# Patient Record
Sex: FEMALE | Race: WHITE | URBAN - METROPOLITAN AREA
[De-identification: names, ages, dates, MRNs, and addresses within clinical notes are randomized per-mention and may not be internally consistent; named-entity substitution may affect disease eponyms.]

---

## 2019-11-07 ENCOUNTER — HOSPITAL ENCOUNTER (OUTPATIENT)
Dept: PHYSICAL THERAPY | Age: 54
Setting detail: THERAPIES SERIES
Discharge: HOME | End: 2019-11-07
Payer: COMMERCIAL

## 2019-11-07 PROCEDURE — 97162 PT EVAL MOD COMPLEX 30 MIN: CPT

## 2019-11-07 PROCEDURE — 97140 MANUAL THERAPY 1/> REGIONS: CPT

## 2019-11-07 ASSESSMENT — PAIN SCALES - GENERAL: PAINLEVEL_OUTOF10: 1

## 2019-11-11 ENCOUNTER — HOSPITAL ENCOUNTER (OUTPATIENT)
Dept: PHYSICAL THERAPY | Age: 54
Setting detail: THERAPIES SERIES
Discharge: HOME | End: 2019-11-11
Payer: COMMERCIAL

## 2019-11-11 PROCEDURE — 97140 MANUAL THERAPY 1/> REGIONS: CPT

## 2019-11-11 PROCEDURE — 97110 THERAPEUTIC EXERCISES: CPT

## 2019-11-15 ENCOUNTER — HOSPITAL ENCOUNTER (OUTPATIENT)
Dept: PHYSICAL THERAPY | Age: 54
Setting detail: THERAPIES SERIES
Discharge: HOME | End: 2019-11-15
Payer: COMMERCIAL

## 2019-11-15 PROCEDURE — 97110 THERAPEUTIC EXERCISES: CPT

## 2019-11-15 PROCEDURE — 97140 MANUAL THERAPY 1/> REGIONS: CPT

## 2019-11-19 ENCOUNTER — HOSPITAL ENCOUNTER (OUTPATIENT)
Dept: PHYSICAL THERAPY | Age: 54
Setting detail: THERAPIES SERIES
Discharge: HOME | End: 2019-11-19
Payer: COMMERCIAL

## 2019-11-19 PROCEDURE — 97110 THERAPEUTIC EXERCISES: CPT

## 2019-11-22 ENCOUNTER — HOSPITAL ENCOUNTER (OUTPATIENT)
Dept: PHYSICAL THERAPY | Age: 54
Setting detail: THERAPIES SERIES
Discharge: HOME | End: 2019-11-22
Payer: COMMERCIAL

## 2019-11-22 PROCEDURE — 97110 THERAPEUTIC EXERCISES: CPT

## 2019-11-22 PROCEDURE — 97140 MANUAL THERAPY 1/> REGIONS: CPT

## 2019-11-27 ENCOUNTER — APPOINTMENT (OUTPATIENT)
Dept: PHYSICAL THERAPY | Age: 54
End: 2019-11-27
Payer: COMMERCIAL

## 2019-11-29 ENCOUNTER — HOSPITAL ENCOUNTER (OUTPATIENT)
Dept: PHYSICAL THERAPY | Age: 54
Setting detail: THERAPIES SERIES
Discharge: HOME | End: 2019-11-29
Payer: COMMERCIAL

## 2019-11-29 PROCEDURE — 97110 THERAPEUTIC EXERCISES: CPT

## 2019-11-29 PROCEDURE — 97140 MANUAL THERAPY 1/> REGIONS: CPT

## 2019-12-02 ENCOUNTER — HOSPITAL ENCOUNTER (OUTPATIENT)
Dept: PHYSICAL THERAPY | Age: 54
Setting detail: THERAPIES SERIES
Discharge: HOME | End: 2019-12-02
Payer: COMMERCIAL

## 2019-12-02 PROCEDURE — 97140 MANUAL THERAPY 1/> REGIONS: CPT

## 2019-12-02 PROCEDURE — 97110 THERAPEUTIC EXERCISES: CPT

## 2019-12-06 ENCOUNTER — HOSPITAL ENCOUNTER (OUTPATIENT)
Dept: PHYSICAL THERAPY | Age: 54
Setting detail: THERAPIES SERIES
Discharge: HOME | End: 2019-12-06
Payer: COMMERCIAL

## 2019-12-06 PROCEDURE — 97110 THERAPEUTIC EXERCISES: CPT

## 2019-12-06 PROCEDURE — 97140 MANUAL THERAPY 1/> REGIONS: CPT

## 2019-12-09 ENCOUNTER — HOSPITAL ENCOUNTER (OUTPATIENT)
Dept: PHYSICAL THERAPY | Age: 54
Setting detail: THERAPIES SERIES
Discharge: HOME | End: 2019-12-09
Payer: COMMERCIAL

## 2019-12-09 PROCEDURE — 97140 MANUAL THERAPY 1/> REGIONS: CPT

## 2019-12-09 PROCEDURE — 97110 THERAPEUTIC EXERCISES: CPT

## 2019-12-13 ENCOUNTER — HOSPITAL ENCOUNTER (OUTPATIENT)
Dept: PHYSICAL THERAPY | Age: 54
Setting detail: THERAPIES SERIES
Discharge: HOME | End: 2019-12-13
Payer: COMMERCIAL

## 2019-12-13 PROCEDURE — 97110 THERAPEUTIC EXERCISES: CPT

## 2019-12-16 ENCOUNTER — HOSPITAL ENCOUNTER (OUTPATIENT)
Dept: PHYSICAL THERAPY | Age: 54
Setting detail: THERAPIES SERIES
Discharge: HOME | End: 2019-12-16
Payer: COMMERCIAL

## 2019-12-16 PROCEDURE — 97110 THERAPEUTIC EXERCISES: CPT

## 2019-12-16 PROCEDURE — 97140 MANUAL THERAPY 1/> REGIONS: CPT

## 2019-12-20 ENCOUNTER — HOSPITAL ENCOUNTER (OUTPATIENT)
Dept: PHYSICAL THERAPY | Age: 54
Setting detail: THERAPIES SERIES
End: 2019-12-20
Payer: COMMERCIAL

## 2019-12-31 ENCOUNTER — HOSPITAL ENCOUNTER (OUTPATIENT)
Dept: PHYSICAL THERAPY | Age: 54
Setting detail: THERAPIES SERIES
Discharge: HOME | End: 2019-12-31
Payer: COMMERCIAL

## 2019-12-31 PROCEDURE — 97110 THERAPEUTIC EXERCISES: CPT

## 2019-12-31 PROCEDURE — 97140 MANUAL THERAPY 1/> REGIONS: CPT

## 2019-12-31 NOTE — FLOWSHEET NOTE
Physical Therapy Daily Treatment Note    Date:  2019    Patient Name:  Donna Fallon    :  1965  MRN: [id number]  Restrictions/Precautions:    Medical/Treatment Diagnosis Information:  · Diagnosis: L Hip Trochanteric Bursitis  Insurance/Certification information:  PT Insurance Information: BCBS  Physician Information:  Referring Practitioner: Shavonne Faust  Plan of care signed (Y/N):  Y  Visit# / total visits:  10/10 1/PRN    G-Code (if applicable):         LEFS  19  20% disability    At initial evaluation 37% disability     Time in:  1:30    Timed Treatment: 30 Total Treatment Time:  30  Time out: 2:00  ________________________________________________________________________________________    Pain Level:    0/10  SUBJECTIVE:  Pt states she is doing much better and significant improvement. Stiffness returned in the L hip and groin over the past couple of days. 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 Worst pain with sitting and increases after a half hour. Standing and walking will loosen it up and decrease the pain. Standing will increase pain 30 mins then has to rest and sit. Walking without limits to pain but pain increases after 60 mins. No problems with sleeping now. pain worse in the am when she wakes then loosens up. Sit to stand is painful in the L lateral hip. No pain in the R hip or LE.   LBP is better    OBJECTIVE:     Exercise/Equipment Resistance/Repetitions Other comments          TA sets with BP cuff         With march       With KFO    HEP video    3 way hip flexor stretching  x5 mins  HEP video    Bridging 10x5 secs  HEP video    Clamshells 1 and 2   HEP video    Prone hip extension       Lumbo-pelvic stabilization     Prone K' flexion with adduction     L hip IR/ER neuro re-ed   x4 mins     LTR        Stacking with MB     2x30 secs B  Decreased L gluteal contraction sensation B LE, normal DTR 2+ B LEs. Other: (+) non-relevant slump B L worse than R.         ASSESSMENT:    Overall, significant gains with therapy. Hip stiffness resolved with manual therapy today. Continue to progress POC. Treatment/Activity Tolerance:   [x]Patient tolerated treatment well [] Patient limited by fatique  []Patient limited by pain [] Patient limited by other medical complications  [] Other:     Goals:    Long term goals  Time Frame for Long term goals : 4 weeks   Long term goal 1: Pt independent with HEP (met)  Long term goal 2: Pt gluteal strength imrpove by 1/3 muscle grade  Long term goal 3: Pt sit without limits to pain  (improved)  Long term goal 4: Pt stand for functional tasks wtihout limits to pain (met)  Long term goal 5: Pt return to exercising without limits to pain.   (improved)     Plan: [x] Continue per plan of care [] Alter current plan (see comments)   [] Plan of care initiated [] Hold pending MD visit [] Discharge      Plan for Next Session:      Re-Certification Due Date:         Signature:  Bj Sorto , PT

## 2020-01-15 ENCOUNTER — HOSPITAL ENCOUNTER (OUTPATIENT)
Dept: PHYSICAL THERAPY | Age: 55
Setting detail: THERAPIES SERIES
Discharge: HOME | End: 2020-01-15
Payer: COMMERCIAL

## 2020-01-15 PROCEDURE — 97110 THERAPEUTIC EXERCISES: CPT

## 2020-01-15 PROCEDURE — 97140 MANUAL THERAPY 1/> REGIONS: CPT

## 2020-01-15 NOTE — FLOWSHEET NOTE
re-ed   x4 mins     LTR        Stacking with MB     2x30 secs B  Decreased L gluteal contraction   Gluteal sets standing     Rockerboard    x1 min rocking each  x1 min F/B M/L     Standing multifidus   x15 B maroon TB     Standing S' extension  x15 B maroon TB     TG  x6 mins     Kneeling S' horiz abd with TA set       Hip abd        3 way hip with SC  15# 2x15      Posterior sling   4\"     Standing TA set with S' horiz abd x15 grey TB     Lateral sling        Other Therapeutic Activities:      Manual Treatments:  Gr 4 LAD L LE, , L hip MWM hip ER, hip flexion. .  Lumbar gapping L L3-4, L4-5 followed by lumbar needing and SB mobs gr 3-4. . .  Gr 4 thoracic distraction with cavitation at set up. Prone L SI gr 4 distraction with cavitation. Modalities:      Test/Measurements:    Today:   No LLD  Full AROM lumbar spine  Hypomobility L hip with pain in hip IR/ER end range   Tightness L iliopsoas    At initial evaluation  Posture: Fair(sway back without gluteal and core activation)  Observation: Standing:  Increased B knee valgus and toeing out. Elevate L pelvis. Side view with increased lumbar compression at L4-5 (point of pain), sway back wtih forward hips. Decresaed gluteal activation. Body Mechanics: SLS R with lateral trunk compensation, trendelenberg, LOB, hip IR. SLS L significant LOB, lateral trunk compensation and trendelenberg. SLS squat with quad dominance, hip IR and trendelenberg B, L worse than R. Squat test Q dominance with slight R weight shifting. AMB: Increased B hip IR, K' valgus, ERS lumbar spine, femoral amb wtih decreased hip ext B and push off B. Lumbar: Hinge at T12-L1 with B SB. Otherwise, full ROM all directions without pain. Joint Mobility  Spine: Hypomobility L1-3.   Hypomobility L SI.       Strength RLE  Strength RLE: WFL  Comment: Hip ER/IR 4-/5; PGM 3-/5; hip ext 4-/5  Strength LLE  Strength LLE: WFL  Comment: 4+/5 L LE, Hip ER/IR 4-/5; PGM 3-/5; hip ext 4-/5 Additional Measures  Flexibility: R hip ER 20 degrees, L hip IR 10 degrees. B hip flexion 90 degrees. Special Tests: L PI with (+) L flick test.  (-) clonus, (-) babinski, normal sensation B LE, normal DTR 2+ B LEs. Other: (+) non-relevant slump B L worse than R.         ASSESSMENT:   Hip stiffness resolved with manual therapy today. Continue to progress POC. Treatment/Activity Tolerance:   [x]Patient tolerated treatment well [] Patient limited by fatique  []Patient limited by pain [] Patient limited by other medical complications  [] Other:     Goals:    Long term goals  Time Frame for Long term goals : 4 weeks   Long term goal 1: Pt independent with HEP (met)  Long term goal 2: Pt gluteal strength imrpove by 1/3 muscle grade  Long term goal 3: Pt sit without limits to pain  (improved)  Long term goal 4: Pt stand for functional tasks wtihout limits to pain (met)  Long term goal 5: Pt return to exercising without limits to pain.   (improved)     Plan: [x] Continue per plan of care [] Alter current plan (see comments)   [] Plan of care initiated [] Hold pending MD visit [] Discharge      Plan for Next Session:      Re-Certification Due Date:         Signature:  Kristofer Patel , PT

## 2020-02-03 ENCOUNTER — HOSPITAL ENCOUNTER (OUTPATIENT)
Dept: PHYSICAL THERAPY | Age: 55
Setting detail: THERAPIES SERIES
Discharge: HOME | End: 2020-02-03
Payer: COMMERCIAL

## 2020-02-03 PROCEDURE — 97110 THERAPEUTIC EXERCISES: CPT

## 2020-02-03 PROCEDURE — 97140 MANUAL THERAPY 1/> REGIONS: CPT

## 2020-02-03 NOTE — PROGRESS NOTES
Outpatient Physical Therapy  Phone: [phone] Fax: [phone]     To: Guanakito Louis      From: Abisai Leslie PT     Patient: Patricia Umana       : 1965  Diagnosis: L hip Trochanteric Bursitis  Date: 2/3/2020  Treatment Diagnosis:      Physical Therapy Progress/Discharge Note    Total Visits to date:   15  Cancels/No-shows to date:      Plan of Care/Treatment to date:  [x] Therapeutic Exercise    [] Modalities:  [x] Therapeutic Activity     [] Ultrasound   [] Electrical Stimulation   [x] Gait Training      [] Cervical Traction   [] Lumbar Traction  [x] Neuromuscular Re-education  [] Cold/hotpack  [] Iontophoresis  [x] Instruction in HEP      Other:  [x] Manual Therapy       []                                 [] Aquatic Therapy       []                                     Progress towards goals:  See progress note       Frequency/Duration:  # Days per week: [x] 1 day # Weeks: [] 1 week [x] 4 weeks      [x] 2 days   [] 2 weeks [] 5 weeks     [] 3 days   [] 3 weeks [] 6 weeks     Rehab Potential: [] Excellent [x] Good [] Fair  [] Poor     Goal Status:  [] Achieved [x] Partially Achieved  [] Not Achieved     Patient Status: [] Continue per initial plan of Care     [] Patient now discharged     [x] Additional visits requested, Please re-certify for additional visits:      Requested frequency/duration:  2X/week for 4 weeks    Electronically signed by:  Abisai Leslie PT    If you have any questions or concerns, please don't hesitate to call.   Thank you for your referral.    Physician Signature:________________________________Date:__________________  By signing above, therapists plan is approved by physician
by other medical complications  [] Other:     Goals:    Long term goals   Time Frame for Long term goals : 4 weeks   Long term goal 1: Pt independent with HEP (met)  Long term goal 2: Pt gluteal strength imrpove by 1/3 muscle grade (not met; decreased from previous assessment)  Long term goal 3: Pt sit without limits to pain  (improved)  Long term goal 4: Pt stand for functional tasks wtihout limits to pain (met)  Long term goal 5: Pt return to exercising without limits to pain. (improved)     Plan: [x] Continue per plan of care [] Alter current plan (see comments)   [] Plan of care initiated [] Hold pending MD visit [] Discharge      Plan for Next Session:       Re-Certification Due Date:         Signature:  Carola Viera PT  Brett Keller, SPT  Physical Therapist was present, directed the patient's care, made skilled judgement, and was responsible for assessment and treatment of the patient.

## 2020-02-03 NOTE — FLOWSHEET NOTE
Therapeutic Activities:      Manual Treatments:  Gr 4 LAD L LE, , L hip MWM hip ER, hip flexion. STM L QL followed by manual stretching and kneading. Lumbar gapping L L3-4, L4-5 followed by lumbar needing and SB mobs gr 3-4. . .  Gr 4 thoracic distraction with cavitation at set up. Prone L SI gr 4 distraction with cavitation. L sacral SB correction through 3 breaths followed by S3 through 3 breaths. Modalities:      Test/Measurements:    Today:   LLD L longer than R  L AI   L sacral SB   Full AROM lumbar spine  Tightness L iliopsoas    Strength RLE  Strength RLE: WFL  Comment: Hip ER/IR 4-/5; PGM 3-/5; hip ext 4-/5  Strength LLE  Strength LLE: WFL  Comment: 4+/5 L LE, Hip ER/IR 4-/5; PGM 3-/5; hip ext 4-/5  Hip B AGMR, ERS lumbar spine    At initial evaluation  Posture: Fair(sway back without gluteal and core activation)  Observation: Standing:  Increased B knee valgus and toeing out. Elevate L pelvis. Side view with increased lumbar compression at L4-5 (point of pain), sway back wtih forward hips. Decresaed gluteal activation. Body Mechanics: SLS R with lateral trunk compensation, trendelenberg, LOB, hip IR. SLS L significant LOB, lateral trunk compensation and trendelenberg. SLS squat with quad dominance, hip IR and trendelenberg B, L worse than R. Squat test Q dominance with slight R weight shifting. AMB: Increased B hip IR, K' valgus, ERS lumbar spine, femoral amb wtih decreased hip ext B and push off B. Lumbar: Hinge at T12-L1 with B SB. Otherwise, full ROM all directions without pain. Joint Mobility  Spine: Hypomobility L1-3. Hypomobility L SI.       Strength RLE  Strength RLE: WFL  Comment: Hip ER/IR 4-/5; PGM 3-/5; hip ext 4-/5  Strength LLE  Strength LLE: WFL  Comment: 4+/5 L LE, Hip ER/IR 4-/5; PGM 3-/5; hip ext 4-/5   Additional Measures  Flexibility: R hip ER 20 degrees, L hip IR 10 degrees. B hip flexion 90 degrees.    Special Tests: L PI with (+) L flick test.  (-) clonus,

## 2020-02-25 ENCOUNTER — HOSPITAL ENCOUNTER (OUTPATIENT)
Dept: PHYSICAL THERAPY | Age: 55
Setting detail: THERAPIES SERIES
Discharge: HOME | End: 2020-02-25
Payer: COMMERCIAL

## 2020-02-25 PROCEDURE — 97162 PT EVAL MOD COMPLEX 30 MIN: CPT

## 2020-02-25 NOTE — PROGRESS NOTES
Physical Therapy  Initial Assessment  Date: 2020  Patient Name: Haritha Barajas  MRN: [id number]  : 1965      Subjective   General  Chart Reviewed: Yes  Patient assessed for rehabilitation services?: Yes  Family / Caregiver Present: No  Referring Practitioner: Rosamaria Dudley  Referral Date : 20  Diagnosis: Troch bursitis, LBP  Follows Commands: Within Functional Limits  General Comment  Comments: PLOF: Pt likes to return to yoga, aerobics, and walking/hiking about 2 miles, all without limitations to pain. PT Visit Information  PT Insurance Information: BCBS  Subjective  Subjective: Pt reports pain in the L buttocks that started about three years ago and only one year ago the pain started radiating down into the leg. Pain at its worse is a sharp shooting 7-8/10, currently 2-3/10 and at best 1/10. To ease the pain she uses a topical cream, rests, or takes a hot bath. Ices the hip for pain. She's only able to sit for approximately 20 minutes before having limitations to pain along with driving. She states anything longer than hour standing or walking, she experiences pain in the back. Having difficutly getting to sleep because of the pain and occasionally wakes to pain when on her R side. She experiences increased pain in her hip when going up stairs and occasionally will experience pain in the back. Denies strength changes/weakness. Increased stiffness in the A.M. The pain radiates down the anterior leg into the lower lateral leg. She has difficulty crossing her legs B, but L > R.  Pain Screening  Patient Currently in Pain: Yes  Vital Signs  Patient Currently in Pain: Yes     Objective     Observation/Palpation  Observation: Standing: B knee valgus, Swayback posture with decreased gluteal activation, L pelvic rotation, decreased thoracic curvature,   Body Mechanics: SLS R with lateral trunk compensation, trendelenberg, LOB, hip IR. SLS L significant LOB, lateral trunk compensation and trendelenberg. SLS squat with quad dominance, hip IR and trendelenberg B, R worse than L. Squat test Q dominance with slight R weight shifting. AMB: Increased B hip IR, K' valgus, ERS lumbar spine, femoral amb wtih decreased hip ext B and push off B. PROM RLE (degrees)  RLE PROM: WNL  AROM RLE (degrees)  RLE AROM: WNL  PROM LLE (degrees)  LLE PROM: WNL  AROM LLE (degrees)  LLE AROM : WNL  Spine  Lumbar: Hinge at T12-L1 with B SB. Otherwise, full ROM all directions without pain. Joint Mobility  Spine: Hypomobility L1-3; Decreased rotation B R > L     Strength RLE  Strength RLE: WFL  Comment: Hip ER/IR 4-/5; PGM 3-/5; Knee flx 4/5  Strength LLE  Comment: Hip ER/IR 4-/5; PGM 3-/5; Knee flx 4/5     Additional Measures  Flexibility: R hip ER 20 degrees, L hip IR 10 degrees. Special Tests: L PI with (+) L flick test.  (-) clonus, (-) babinski, normal sensation B LE except increased sensitivity L L3,4. Normal DTR 2+ B LEs. Other: (+) relevant slump B  (+) SLR R leg       Assessment   Conditions Requiring Skilled Therapeutic Intervention  Assessment: Pt presents with movement impairments of ERS lumbar with amb. Exhibits neural involvement with (+) relevant slump test B. Significant weakness in B gluteals, PGM, exhibited by LOB, trendelenberg and hip IR with SLS testing.     Prognosis: Good  Decision Making: Medium Complexity  REQUIRES PT FOLLOW UP: Yes     Plan   Plan  Times per week: 2x/wk for 4 weeks   Current Treatment Recommendations: Strengthening, Gait Training, Manual Therapy - Joint Manipulation, ROM, Neuromuscular Re-education, Endurance Training, Manual Therapy - Soft Tissue Mobilization, Home Exercise Program, Patient/Caregiver Education & Training, Modalities, Pain Management    G-Code    Modified Oswestry  34% disability    Goals  Long term goals  Time Frame for Long term goals : 4 weeks   Long term goal 1: Pt independent with HEP   Long term goal 2: Pt gluteal strength imrpove by 1/3 muscle grade  Long term

## 2020-02-25 NOTE — FLOWSHEET NOTE
Physical Therapy Daily Treatment Note    Date:  2020    Patient Name:  Comfort Caceres    :  1965  MRN: [id number]  Restrictions/Precautions:    Medical/Treatment Diagnosis Information:  · Diagnosis: Troch bursitis, LBP  Insurance/Certification information:  PT Insurance Information: BCBS  Physician Information:  Referring Practitioner: Walter Ferris  Plan of care signed (Y/N):  Y  Visit# / total visits:       G-Code (if applicable): Modified Oswestry  34% disability    Time in:   11:00      Timed Treatment: 0  Total Treatment Time:  60  Time out: 12:00   ________________________________________________________________________________________    Pain Level:    /10  SUBJECTIVE:      Initial Eval: Pt reports pain in the L buttocks that started about three years ago and only one year ago the pain started radiating down into the leg. Pain at its worse is a sharp shooting 7-8/10, currently 2-3/10 and at best 1/10. To ease the pain she uses a topical cream, rests, or takes a hot bath. Ices the hip for pain. She's only able to sit for approximately 20 minutes before having limitations to pain along with driving. She states anything longer than hour standing or walking, she experiences pain in the back. Having difficutly getting to sleep because of the pain and occasionally wakes to pain when on her R side. She experiences increased pain in her hip when going up stairs and occasionally will experience pain in the back. Denies strength changes/weakness. Increased stiffness in the A.M. The pain radiates down the anterior leg into the lower lateral leg.  She has difficulty crossing her legs B, but L > R.    OBJECTIVE:     Exercise/Equipment Resistance/Repetitions Other comments            TA with BP cuff  Marching  KFO NV                                                                                                                                Other Therapeutic Activities:      Manual Treatments:

## 2020-02-28 ENCOUNTER — HOSPITAL ENCOUNTER (OUTPATIENT)
Dept: PHYSICAL THERAPY | Age: 55
Setting detail: THERAPIES SERIES
Discharge: HOME | End: 2020-02-28
Payer: COMMERCIAL

## 2020-02-28 PROCEDURE — 97110 THERAPEUTIC EXERCISES: CPT

## 2020-02-28 PROCEDURE — 97140 MANUAL THERAPY 1/> REGIONS: CPT

## 2020-02-28 NOTE — FLOWSHEET NOTE
secs  HEP video           Hip abd   15# 2x15 B     S' extension x15 maroon TB     L hip IR/ER neuro re-ed   x3 mins                                                  TG   x6 mins                                   Other Therapeutic Activities:      Manual Treatments: B LE pulling. B LAD gr 5. Lumbar gapping L without cavitation gr 3-4. L sacral SB correction through 3 breaths gr 4. S3 mob gr 4 through 3 breaths. STM L gluteal followed by MWM. Modalities:       Test/Measurements:         ASSESSMENT:   Pt responded well to manual therapy with decreased pain and improved mobility. Treatment/Activity Tolerance:   [x]Patient tolerated treatment well [] Patient limited by fatique  []Patient limited by pain [] Patient limited by other medical complications  [] Other:     Goals:        Long term goals  Time Frame for Long term goals : 4 weeks   Long term goal 1: Pt independent with HEP   Long term goal 2: Pt gluteal strength imrpove by 1/3 muscle grade  Long term goal 3: Pt sit without limits to pain  Long term goal 4: Pt stand for functional tasks wtihout limits to pain  Long term goal 5: Pt return to exercising without limits to pain. Plan: [x] Continue per plan of care [] Alter current plan (see comments)   [] Plan of care initiated [] Hold pending MD visit [] Discharge      Plan for Next Session:      Re-Certification Due Date:         Signature:  Leo Crowley , PT  Matt Reza, SPT   Physical Therapist was present, directed the patient's care, made skilled judgement, and was responsible for assessment and treatment of the patient.

## 2020-03-03 ENCOUNTER — HOSPITAL ENCOUNTER (OUTPATIENT)
Dept: PHYSICAL THERAPY | Age: 55
Setting detail: THERAPIES SERIES
Discharge: HOME | End: 2020-03-03
Payer: COMMERCIAL

## 2020-03-03 PROCEDURE — 97110 THERAPEUTIC EXERCISES: CPT

## 2020-03-03 PROCEDURE — 97140 MANUAL THERAPY 1/> REGIONS: CPT

## 2020-03-06 ENCOUNTER — HOSPITAL ENCOUNTER (OUTPATIENT)
Dept: PHYSICAL THERAPY | Age: 55
Setting detail: THERAPIES SERIES
Discharge: HOME | End: 2020-03-06
Payer: COMMERCIAL

## 2020-03-06 PROCEDURE — 97140 MANUAL THERAPY 1/> REGIONS: CPT

## 2020-03-06 PROCEDURE — 97110 THERAPEUTIC EXERCISES: CPT

## 2020-03-10 ENCOUNTER — HOSPITAL ENCOUNTER (OUTPATIENT)
Dept: PHYSICAL THERAPY | Age: 55
Setting detail: THERAPIES SERIES
Discharge: HOME | End: 2020-03-10
Payer: COMMERCIAL

## 2020-03-10 PROCEDURE — 97110 THERAPEUTIC EXERCISES: CPT

## 2020-03-10 PROCEDURE — 97140 MANUAL THERAPY 1/> REGIONS: CPT

## 2020-03-10 NOTE — FLOWSHEET NOTE
Physical Therapy Daily Treatment Note    Date:  3/10/2020    Patient Name:  Papo Esparza    :  1965  MRN: [id number]  Restrictions/Precautions:    Medical/Treatment Diagnosis Information:  · Diagnosis: Troch bursitis, LBP  Insurance/Certification information:  PT Insurance Information: BCBS  Physician Information:  Referring Practitioner: Landon Klein  Plan of care signed (Y/N):  Y  Visit# / total visits:      G-Code (if applicable): Modified Oswestry  34% disability    Time in:   11:30      Timed Treatment: 30  Total Treatment Time:  30  Time out: 12:00   ________________________________________________________________________________________    Pain Level:    1/10  SUBJECTIVE:  Pt reports feeling an improvement with her symptoms since last visit with less pain and improved ability to tolerate sitting, standing and walking. Initial Eval: Pt reports pain in the L buttocks that started about three years ago and only one year ago the pain started radiating down into the leg. Pain at its worse is a sharp shooting 7-8/10, currently 2-3/10 and at best 1/10. To ease the pain she uses a topical cream, rests, or takes a hot bath. Ices the hip for pain. She's only able to sit for approximately 20 minutes before having limitations to pain along with driving. She states anything longer than hour standing or walking, she experiences pain in the back. Having difficutly getting to sleep because of the pain and occasionally wakes to pain when on her R side. She experiences increased pain in her hip when going up stairs and occasionally will experience pain in the back. Denies strength changes/weakness. Increased stiffness in the A.M. The pain radiates down the anterior leg into the lower lateral leg.  She has difficulty crossing her legs B, but L > R.    OBJECTIVE:     Exercise/Equipment Resistance/Repetitions Other comments            TA with BP cuff  Marching  KFO  HEP video    Sidelying lumbo-pelvic stabilization with resisted hip ER       Bridging  HEP video      LE nerve glides   HEP   Hip abd       S' extension     Standing multifidus     S' Rows     L hip IR/ER neuro re-ed          Rocker board  x1 min M/L F/B  x1 min balance each way        3-way SLR with SC  x15 each way B       Lumbo-pelvic stabilization x2 mins      Bosu ball mini-squats  Balance x15  x1 min EO and EC                  TG   x6 mins                                   Other Therapeutic Activities:      Manual Treatments:  B LAD gr 5 followed by B leg pull. Lumbar gapping L without cavitation gr 3-4 followed by STM to gluteals and neural tracing down L LE x4 mins. Modalities:        Test/Measurements:    Normal B hip IR/ER ROM and hip joint mobility   Symmetrical pelvis       ASSESSMENT:   Pt symptoms have improved from week to week. She has been able to progress standing stabilization exercise. Pt tolerating therex well with no increase to pain. Continue to progress POC per pt tolerance. Treatment/Activity Tolerance:   [x]Patient tolerated treatment well [] Patient limited by fatique  []Patient limited by pain [] Patient limited by other medical complications  [] Other:     Goals:        Long term goals  Time Frame for Long term goals : 4 weeks   Long term goal 1: Pt independent with HEP   Long term goal 2: Pt gluteal strength imrpove by 1/3 muscle grade  Long term goal 3: Pt sit without limits to pain  Long term goal 4: Pt stand for functional tasks wtihout limits to pain  Long term goal 5: Pt return to exercising without limits to pain.       Plan: [x] Continue per plan of care [] Alter current plan (see comments)   [] Plan of care initiated [] Hold pending MD visit [] Discharge      Plan for Next Session:      Re-Certification Due Date:         Signature:  Leonor Khan , PT  Shana Gillette, SPT   Physical Therapist was present, directed the patient's care, made skilled judgement, and was responsible for assessment and

## 2020-03-13 ENCOUNTER — HOSPITAL ENCOUNTER (OUTPATIENT)
Dept: PHYSICAL THERAPY | Age: 55
Setting detail: THERAPIES SERIES
Discharge: HOME | End: 2020-03-13
Payer: COMMERCIAL

## 2020-03-13 PROCEDURE — 97140 MANUAL THERAPY 1/> REGIONS: CPT

## 2020-03-13 PROCEDURE — 97110 THERAPEUTIC EXERCISES: CPT

## 2020-03-13 NOTE — FLOWSHEET NOTE
Physical Therapy Daily Treatment Note    Date:  3/13/2020    Patient Name:  Pauline Tao    :  1965  MRN: [id number]  Restrictions/Precautions:    Medical/Treatment Diagnosis Information:  · Diagnosis: Troch bursitis, LBP  Insurance/Certification information:  PT Insurance Information: BCBS  Physician Information:  Referring Practitioner: Ezequiel Sargent  Plan of care signed (Y/N):  Y  Visit# / total visits:      G-Code (if applicable): Modified Oswestry  34% disability    Time in:   11:30      Timed Treatment: 30  Total Treatment Time:  30  Time out: 12:00   ________________________________________________________________________________________    Pain Level:    2/10  SUBJECTIVE:  Pt reports centralization of symptoms in her L LE and hip and after about 25 minutes of driving, she felt the same symptoms in the L side when she got out of the car. Overall, since starting therapy, her symptoms have improved with movement and she has had a decrease in frequency and intensity of pain. Initial Eval: Pt reports pain in the L buttocks that started about three years ago and only one year ago the pain started radiating down into the leg. Pain at its worse is a sharp shooting 7-8/10, currently 2-3/10 and at best 1/10. To ease the pain she uses a topical cream, rests, or takes a hot bath. Ices the hip for pain. She's only able to sit for approximately 20 minutes before having limitations to pain along with driving. She states anything longer than hour standing or walking, she experiences pain in the back. Having difficutly getting to sleep because of the pain and occasionally wakes to pain when on her R side. She experiences increased pain in her hip when going up stairs and occasionally will experience pain in the back. Denies strength changes/weakness. Increased stiffness in the A.M. The pain radiates down the anterior leg into the lower lateral leg.  She has difficulty crossing her legs B, but L > R. OBJECTIVE:     Exercise/Equipment Resistance/Repetitions Other comments            TA with BP cuff  Marching  KFO  HEP video    Sidelying lumbo-pelvic stabilization with resisted hip ER       Bridging  HEP video      LE nerve glides   HEP   Hip abd       S' extension     Standing multifidus     S' Rows     L hip IR/ER neuro re-ed          Rocker board  x1 min M/L F/B  x1 min balance each way        3-way SLR with SC         Lumbo-pelvic stabilization       Bosu ball mini-squats  Balance x8  x1 min EO and EC (stopped due to pain in L lateral thigh with squats)     Abduction 15# 2x15 B           TG   x6 mins                                   Other Therapeutic Activities:      Manual Treatments:  B LAD gr 5 with cavitation followed by B leg pull. B general Lumbar gapping with cavitation gr 3-4 followed by STM to gluteals and neural tracing down L LE x4 mins. Modalities:        Test/Measurements:    Normal B hip IR/ER ROM and hip joint mobility   Symmetrical pelvis       ASSESSMENT:   Pt experienced a centralization since last visit. She continues to demonstrate normal hip motion and a symmetrical pelvis, but did have pain in L lower back/hip with R hip IR but was relieved after manual therapy. Continue to progress POC per pt tolerance. Treatment/Activity Tolerance:   [x]Patient tolerated treatment well [] Patient limited by fatique  []Patient limited by pain [] Patient limited by other medical complications  [] Other:     Goals:        Long term goals  Time Frame for Long term goals : 4 weeks   Long term goal 1: Pt independent with HEP   Long term goal 2: Pt gluteal strength imrpove by 1/3 muscle grade  Long term goal 3: Pt sit without limits to pain  Long term goal 4: Pt stand for functional tasks wtihout limits to pain  Long term goal 5: Pt return to exercising without limits to pain.       Plan: [x] Continue per plan of care [] Alter current plan (see comments)   [] Plan of care initiated [] Hold pending MD visit [] Discharge      Plan for Next Session:      Re-Certification Due Date:         Signature:  CANDIE Fleming SPT   Physical Therapist was present, directed the patient's care, made skilled judgement, and was responsible for assessment and treatment of the patient.

## 2020-03-17 ENCOUNTER — HOSPITAL ENCOUNTER (OUTPATIENT)
Dept: PHYSICAL THERAPY | Age: 55
Setting detail: THERAPIES SERIES
Discharge: HOME | End: 2020-03-17
Payer: COMMERCIAL

## 2020-03-17 PROCEDURE — 97110 THERAPEUTIC EXERCISES: CPT

## 2020-03-17 NOTE — FLOWSHEET NOTE
Physical Therapy Daily Treatment Note    Date:  3/17/2020    Patient Name:  Gail Stevens    :  1965  MRN: [id number]  Restrictions/Precautions:    Medical/Treatment Diagnosis Information:  · Diagnosis: Troch bursitis, LBP  Insurance/Certification information:  PT Insurance Information: BCBS  Physician Information:  Referring Practitioner: Mark Anthony Chatterjee  Plan of care signed (Y/N):  Y  Visit# / total visits:      G-Code (if applicable): Modified Oswestry  34% disability    Time in:   10:00      Timed Treatment: 30  Total Treatment Time:  30  Time out: 10:30   ________________________________________________________________________________________    Pain Level:    2/10  SUBJECTIVE:  Pt reports centralization of symptoms in her L LE and hip. Overall, since starting therapy, her symptoms have improved with movement and she has had a decrease in frequency and intensity of pain. Pt reports being 'very pleased'. Initial Eval: Pt reports pain in the L buttocks that started about three years ago and only one year ago the pain started radiating down into the leg. Pain at its worse is a sharp shooting 7-8/10, currently 2-3/10 and at best 1/10. To ease the pain she uses a topical cream, rests, or takes a hot bath. Ices the hip for pain. She's only able to sit for approximately 20 minutes before having limitations to pain along with driving. She states anything longer than hour standing or walking, she experiences pain in the back. Having difficutly getting to sleep because of the pain and occasionally wakes to pain when on her R side. She experiences increased pain in her hip when going up stairs and occasionally will experience pain in the back. Denies strength changes/weakness. Increased stiffness in the A.M. The pain radiates down the anterior leg into the lower lateral leg.  She has difficulty crossing her legs B, but L > R.    OBJECTIVE:     Exercise/Equipment Resistance/Repetitions Other comments            TA with BP cuff  Marching  KFO  HEP video    Sidelying lumbo-pelvic stabilization with resisted hip ER       Bridging 10x5 secs  HEP video      LE nerve glides   HEP   Hip abd       S' extension     Standing multifidus     S' Rows     L hip IR/ER neuro re-ed          Rocker board  x1 min M/L F/B  x1 min balance each way        3-way SLR with SC         Lumbo-pelvic stabilization       Bosu ball mini-squats  Balance  (stopped due to pain in L lateral thigh with squats)     Abduction    Extension  15# 2x15 B  45# x15 B    Single leg circles   x10 B  HEP    TG   x6 mins                                   Other Therapeutic Activities:      Manual Treatments:  B LAD gr 5 with cavitation followed by B leg pull. Modalities:        Test/Measurements:    Normal B hip IR/ER ROM and hip joint mobility   Symmetrical pelvis       ASSESSMENT:   Continue to progress POC per pt tolerance. Treatment/Activity Tolerance:   [x]Patient tolerated treatment well [] Patient limited by fatique  []Patient limited by pain [] Patient limited by other medical complications  [] Other:     Goals:        Long term goals  Time Frame for Long term goals : 4 weeks   Long term goal 1: Pt independent with HEP   Long term goal 2: Pt gluteal strength imrpove by 1/3 muscle grade  Long term goal 3: Pt sit without limits to pain  Long term goal 4: Pt stand for functional tasks wtihout limits to pain  Long term goal 5: Pt return to exercising without limits to pain.       Plan: [x] Continue per plan of care [] Alter current plan (see comments)   [] Plan of care initiated [] Hold pending MD visit [] Discharge      Plan for Next Session:      Re-Certification Due Date:         Signature:  Patrica Mckeon PT

## 2020-03-20 ENCOUNTER — APPOINTMENT (OUTPATIENT)
Dept: PHYSICAL THERAPY | Age: 55
End: 2020-03-20
Payer: COMMERCIAL

## 2020-03-24 ENCOUNTER — APPOINTMENT (OUTPATIENT)
Dept: PHYSICAL THERAPY | Age: 55
End: 2020-03-24
Payer: COMMERCIAL

## 2020-03-27 ENCOUNTER — APPOINTMENT (OUTPATIENT)
Dept: PHYSICAL THERAPY | Age: 55
End: 2020-03-27
Payer: COMMERCIAL

## 2020-03-31 ENCOUNTER — APPOINTMENT (OUTPATIENT)
Dept: PHYSICAL THERAPY | Age: 55
End: 2020-03-31
Payer: COMMERCIAL

## 2020-10-31 ENCOUNTER — HOSPITAL ENCOUNTER (EMERGENCY)
Age: 55
Discharge: HOME | End: 2020-11-01
Payer: COMMERCIAL

## 2020-10-31 PROCEDURE — 99283 EMERGENCY DEPT VISIT LOW MDM: CPT

## 2020-10-31 ASSESSMENT — PAIN DESCRIPTION - LOCATION: LOCATION: FACE

## 2020-10-31 ASSESSMENT — PAIN SCALES - GENERAL: PAINLEVEL_OUTOF10: 3

## 2020-10-31 ASSESSMENT — PAIN DESCRIPTION - PAIN TYPE: TYPE: ACUTE PAIN

## 2020-11-01 ENCOUNTER — APPOINTMENT (OUTPATIENT)
Dept: CT IMAGING | Age: 55
End: 2020-11-01
Payer: COMMERCIAL

## 2020-11-01 VITALS
HEART RATE: 88 BPM | BODY MASS INDEX: 28.17 KG/M2 | SYSTOLIC BLOOD PRESSURE: 120 MMHG | OXYGEN SATURATION: 99 % | HEIGHT: 64 IN | WEIGHT: 165 LBS | RESPIRATION RATE: 18 BRPM | DIASTOLIC BLOOD PRESSURE: 79 MMHG | TEMPERATURE: 98.4 F

## 2020-11-01 PROCEDURE — 6370000000 HC RX 637 (ALT 250 FOR IP): Performed by: PHYSICIAN ASSISTANT

## 2020-11-01 PROCEDURE — 70486 CT MAXILLOFACIAL W/O DYE: CPT

## 2020-11-01 RX ORDER — IBUPROFEN 600 MG/1
600 TABLET ORAL ONCE
Status: COMPLETED | OUTPATIENT
Start: 2020-11-01 | End: 2020-11-01

## 2020-11-01 RX ORDER — IBUPROFEN 600 MG/1
600 TABLET ORAL
Qty: 21 TABLET | Refills: 0 | Status: SHIPPED | OUTPATIENT
Start: 2020-11-01 | End: 2020-11-08

## 2020-11-01 RX ORDER — ACETAMINOPHEN 325 MG/1
650 TABLET ORAL ONCE
Status: COMPLETED | OUTPATIENT
Start: 2020-11-01 | End: 2020-11-01

## 2020-11-01 RX ORDER — ACETAMINOPHEN 500 MG
1000 TABLET ORAL
Qty: 30 TABLET | Refills: 0 | Status: SHIPPED | OUTPATIENT
Start: 2020-11-01

## 2020-11-01 RX ADMIN — ACETAMINOPHEN 650 MG: 325 TABLET ORAL at 00:40

## 2020-11-01 RX ADMIN — IBUPROFEN 600 MG: 600 TABLET, FILM COATED ORAL at 00:42

## 2020-11-01 ASSESSMENT — PAIN SCALES - GENERAL: PAINLEVEL_OUTOF10: 3

## 2020-11-01 NOTE — ED NOTES
Bed: 28  Expected date:   Expected time:   Means of arrival:   Comments:     Harwood Prader, RN  10/31/20 3239

## 2020-11-01 NOTE — ED NOTES
Bed: 29  Expected date:   Expected time:   Means of arrival:   Comments:     Nata Bender RN  10/31/20 6853

## 2020-11-01 NOTE — ED PROVIDER NOTES
[facility]  ED  EMERGENCY DEPARTMENT ENCOUNTER        Pt Name: Charo Anderson  MRN: [id number]  [place] 1965  Date of evaluation: 10/31/2020  Provider: Elif Eric PA-C  PCP: Constantino [facility],[address]    ADRIENNE. I have evaluated this patient. My supervising physician was available for consultation. Mar Zafar MD      CHIEF COMPLAINT       Chief Complaint   Patient presents with    Fall     patient fell up the stairs and hit her chin/jaw, abrasion to left jaw       HISTORY OF PRESENT ILLNESS   (Location, Timing/Onset, Context/Setting, Quality, Duration, Modifying Factors, Severity, Associated Signs and Symptoms)  Note limiting factors. Charo Anderson is a 50 y.o. female presenting with her . Patient reports that approximately 10:30 PM this evening with assessment time being 11:40 PM she was going upstairs holding items in hand when she stumbled up the stairs and fell forward. The impact resulted in impact to the left jaw area. Pain is feeling in the area. Very minor abrasion. No dental disruption reported by the patient. No headache or neck pain reported. She indicates no other orthopedic related injuries patient presents specifically for the pain involving the left mandible. The patient is a tetanus shot was July 18, 2018. Nursing Notes were all reviewed and agreed with or any disagreements were addressed in the HPI. REVIEW OF SYSTEMS    (2-9 systems for level 4, 10 or more for level 5)     Review of Systems    Positives and Pertinent negatives as per HPI. Except as noted above in the ROS, all other systems were reviewed and negative.        PAST MEDICAL HISTORY     Past Medical History:   Diagnosis Date    Acid reflux     Anxiety     Arthritis          SURGICAL HISTORY     Past Surgical History:   Procedure Laterality Date    BREAST SURGERY           CURRENTMEDICATIONS       Discharge Medication List as of 11/1/2020  1:01 AM      CONTINUE these medications which have NOT CHANGED    Details   famotidine (PEPCID) 40 MG tablet Take 40 mg by mouth daily      citalopram (CELEXA) 20 MG tablet Take 20 mg by mouth daily      Flaxseed, Linseed, (FLAX SEED OIL) 1000 MG CAPS Take 1,000 mg by mouth daily      calcium carbonate (OSCAL) 500 MG TABS tablet Take 500 mg by mouth daily      Vitamin D (CHOLECALCIFEROL) 1000 UNITS CAPS capsule Take 1,000 Units by mouth daily               ALLERGIES     Lodine [etodolac]    FAMILYHISTORY     No family history on file. SOCIAL HISTORY       Social History     Tobacco Use    Smoking status: Never Smoker    Smokeless tobacco: Never Used   Substance Use Topics    Alcohol use: Yes     Alcohol/week: 7.0 standard drinks     Types: 7 Glasses of wine per week     Comment: 7 drinks per week    Drug use: No       SCREENINGS             PHYSICAL EXAM    (up to 7 for level 4, 8 or more for level 5)     ED Triage Vitals [10/31/20 2757]   BP Temp Temp Source Pulse Resp SpO2 Height Weight   113/76 98.4 °F (36.9 °C) Oral 89 14 98 % 5' 4\" (1.626 m) 165 lb (74.8 kg)       Physical Exam  Vitals signs and nursing note reviewed. Constitutional:       Appearance: Normal appearance. She is well-developed and normal weight. HENT:      Head: Normocephalic and atraumatic. Right Ear: External ear normal.      Left Ear: External ear normal.      Mouth/Throat:      Comments: Patient with swelling and tenderness over the mid left mandible. No instability. She does open mouth widely. She indicates no malocclusion. She has no TM joint tenderness. Very subtle abrasion in the area. No repair required. Eyes:      General: No scleral icterus. Right eye: No discharge. Left eye: No discharge. Conjunctiva/sclera: Conjunctivae normal.   Neck:      Musculoskeletal: Normal range of motion and neck supple. Cardiovascular:      Rate and Rhythm: Normal rate.    Pulmonary:      Effort: Pulmonary effort is normal.   Musculoskeletal: Normal range of motion. Skin:     General: Skin is warm and dry. Neurological:      General: No focal deficit present. Mental Status: She is alert and oriented to person, place, and time. Mental status is at baseline. Psychiatric:         Mood and Affect: Mood normal.         Behavior: Behavior normal.         Thought Content: Thought content normal.         Judgment: Judgment normal.         DIAGNOSTIC RESULTS   LABS:    Labs Reviewed - No data to display    All other labs were within normal range or not returned as of this dictation. EKG: All EKG's are interpreted by the Emergency Department Physician in the absence of a cardiologist.  Please see their note for interpretation of EKG. RADIOLOGY:   Non-plain film images such as CT, Ultrasound and MRI are read by the radiologist. Plain radiographic images are visualized and preliminarily interpreted by the ED Provider with the below findings:        Interpretation per the Radiologist below, if available at the time of this note:    CT FACIAL BONES WO CONTRAST   Final Result   No acute traumatic injury of the facial bones. No results found. PROCEDURES   Unless otherwise noted below, none     Procedures    CRITICAL CARE TIME   N/A    CONSULTS:  None      EMERGENCY DEPARTMENT COURSE and DIFFERENTIAL DIAGNOSIS/MDM:   Vitals:    Vitals:    10/31/20 2258 11/01/20 0118   BP: 113/76 120/79   Pulse: 89 88   Resp: 14 18   Temp: 98.4 °F (36.9 °C)    TempSrc: Oral    SpO2: 98% 99%   Weight: 165 lb (74.8 kg)    Height: 5' 4\" (1.626 m)        Patient was given the following medications:  Medications   acetaminophen (TYLENOL) tablet 650 mg (650 mg Oral Given 11/1/20 0040)   ibuprofen (ADVIL;MOTRIN) tablet 600 mg (600 mg Oral Given 11/1/20 0042)           Presented with history of fall and impact injury to the left mandible. CT scan facial bones negative for fracture. Ice applied. Ibuprofen Tylenol given here in emergency room.   I will discharge patient with ibuprofen and Tylenol. Ice recommended. She is aware to expect soreness of the neck several days. The patient does express understanding of her diagnosis and the treatment plan. She is accompanied by her . FINAL IMPRESSION      1. Contusion of jaw, initial encounter          DISPOSITION/PLAN   DISPOSITION Decision To Discharge 11/01/2020 12:58:35 AM      PATIENT REFERREDTO:  Tesfaye Goodson    Schedule an appointment as soon as possible for a visit on 11/3/2020      [facility]  ED  [address] [facility] [address] [facility]  Go to   If symptoms worsen      DISCHARGE MEDICATIONS:  Discharge Medication List as of 11/1/2020  1:01 AM      START taking these medications    Details   ibuprofen (ADVIL;MOTRIN) 600 MG tablet Take 1 tablet by mouth 3 times daily (with meals) for 7 days, Disp-21 tablet,R-0Print      acetaminophen (TYLENOL) 500 MG tablet Take 2 tablets by mouth 3 times daily (with meals), Disp-30 tablet,R-0Print             DISCONTINUED MEDICATIONS:  Discharge Medication List as of 11/1/2020  1:01 AM                 (Please note that portions of this note were completed with a voice recognition program.  Efforts were made to edit the dictations but occasionally words are mis-transcribed. )    Milo Farris PA-C (electronically signed)           Milo Farris PA-C  11/01/20 7381

## 2023-01-10 ENCOUNTER — HOSPITAL ENCOUNTER (EMERGENCY)
Age: 58
Discharge: HOME | End: 2023-01-10
Payer: COMMERCIAL

## 2023-01-10 VITALS
TEMPERATURE: 98.1 F | RESPIRATION RATE: 18 BRPM | HEART RATE: 67 BPM | DIASTOLIC BLOOD PRESSURE: 68 MMHG | OXYGEN SATURATION: 98 % | SYSTOLIC BLOOD PRESSURE: 124 MMHG

## 2023-01-10 DIAGNOSIS — R51.9 FACIAL PAIN: Primary | ICD-10-CM

## 2023-01-10 PROCEDURE — 99283 EMERGENCY DEPT VISIT LOW MDM: CPT

## 2023-01-10 RX ORDER — METHYLPREDNISOLONE 4 MG/1
TABLET ORAL
Qty: 1 KIT | Refills: 0 | Status: SHIPPED | OUTPATIENT
Start: 2023-01-10 | End: 2023-01-16

## 2023-01-10 ASSESSMENT — PAIN - FUNCTIONAL ASSESSMENT: PAIN_FUNCTIONAL_ASSESSMENT: NONE - DENIES PAIN

## 2023-01-12 NOTE — ED PROVIDER NOTES
[facility]  ED  EMERGENCY DEPARTMENT ENCOUNTER        Pt Name: Fifi Levy  MRN: [id number]  Birthdate 1965  Date of evaluation: 1/10/2023  Provider: CECIL Lewis - DIEGO  PCP: NARCISA BEASLEY  Note Started: 11:07 AM EST 1/12/23      ADRIENNE. I have evaluated this patient.  My supervising physician was available for consultation.      CHIEF COMPLAINT       Chief Complaint   Patient presents with    Oral Pain     Tooth abscess in December, healed appropriately. Over the last week, feels that left side of face is more \"full\" and is starting to move into neck and both arms.        HISTORY OF PRESENT ILLNESS: 1 or more Elements     History from : Patient    Limitations to history : None    Fifi Levy is a 57 y.o. female who presents left-sided facial pain.  Patient states that she had an abscessed tooth in December and was placed on antibiotics she states that that seemed to heal and she has no dental pain but she said some persistent left-sided discomfort since that abscess, she states that over the last week she feels like it is gotten worse and, spread throughout her scalp and into her neck.  She is had no fevers, no trauma.  Patient states she was concerned that possibly the infection never went away.  She is had no fevers and no trauma.  No weakness.  She is here for further evaluation.    Nursing Notes were all reviewed and agreed with or any disagreements were addressed in the HPI.    REVIEW OF SYSTEMS :      Review of Systems    Positives and Pertinent negatives as per HPI.     SURGICAL HISTORY     Past Surgical History:   Procedure Laterality Date    BREAST SURGERY         CURRENTMEDICATIONS       Discharge Medication List as of 1/10/2023  3:39 PM        CONTINUE these medications which have NOT CHANGED    Details   ibuprofen (ADVIL;MOTRIN) 600 MG tablet Take 1 tablet by mouth 3 times daily (with meals) for 7 days, Disp-21 tablet,R-0Print      acetaminophen (TYLENOL) 500 MG  tablet Take 2 tablets by mouth 3 times daily (with meals), Disp-30 tablet,R-0Print      famotidine (PEPCID) 40 MG tablet Take 40 mg by mouth daily      citalopram (CELEXA) 20 MG tablet Take 20 mg by mouth daily      Flaxseed, Linseed, (FLAX SEED OIL) 1000 MG CAPS Take 1,000 mg by mouth daily      calcium carbonate (OSCAL) 500 MG TABS tablet Take 500 mg by mouth daily      Vitamin D (CHOLECALCIFEROL) 1000 UNITS CAPS capsule Take 1,000 Units by mouth daily             ALLERGIES     Lodine [etodolac]    FAMILYHISTORY     History reviewed. No pertinent family history. SOCIAL HISTORY       Social History     Tobacco Use    Smoking status: Never    Smokeless tobacco: Never   Substance Use Topics    Alcohol use: Yes     Alcohol/week: 7.0 standard drinks     Types: 7 Glasses of wine per week     Comment: 7 drinks per week    Drug use: No       SCREENINGS        [place] Coma Scale  Eye Opening: Spontaneous  Best Verbal Response: Oriented  Best Motor Response: Obeys commands  Kendra Coma Scale Score: 15                CIWA Assessment  BP: 124/68  Heart Rate: 67           PHYSICAL EXAM  1 or more Elements     ED Triage Vitals [01/10/23 1443]   BP Temp Temp Source Heart Rate Resp SpO2 Height Weight   124/68 98.1 °F (36.7 °C) Oral 67 18 98 % -- --       Physical Exam    Full tactile sensation noted to the face and scalp. No focal neurologic deficits cranial nerves II through XII are intact, there is no facial droop or dysarthria. No swelling appreciated. No adenopathy appreciated. Pupils are equal round and reactive to light and accommodation. Extremities are atraumatic and nontender, good strength noted bilaterally. DIAGNOSTIC RESULTS   LABS:    Labs Reviewed - No data to display    When ordered only abnormal lab results are displayed. All other labs were within normal range or not returned as of this dictation. EKG:  When ordered, EKG's are interpreted by the Emergency Department Physician in the absence of a cardiologist.  Please see their note for interpretation of EKG. RADIOLOGY:   Non-plain film images such as CT, Ultrasound and MRI are read by the radiologist. Plain radiographic images are visualized and preliminarily interpreted by the ED Provider with the below findings:        Interpretation per the Radiologist below, if available at the time of this note:    No orders to display     No results found. No results found. PROCEDURES   Unless otherwise noted below, none     Procedures    CRITICAL CARE TIME (.cctime)       PAST MEDICAL HISTORY      has a past medical history of Acid reflux, Anxiety, and Arthritis. Chronic Conditions affecting Care: none    EMERGENCY DEPARTMENT COURSE and DIFFERENTIAL DIAGNOSIS/MDM:   Vitals:    Vitals:    01/10/23 1443   BP: 124/68   Pulse: 67   Resp: 18   Temp: 98.1 °F (36.7 °C)   TempSrc: Oral   SpO2: 98%       Patient was given the following medications:  Medications - No data to display          Is this patient to be included in the SEP-1 Core Measure due to severe sepsis or septic shock? No   Exclusion criteria - the patient is NOT to be included for SEP-1 Core Measure due to: Infection is not suspected    CONSULTS: (Who and What was discussed)  None  Discussion with Other Profesionals : None    Social Determinants : None    Records Reviewed : None    CC/HPI Summary, DDx, ED Course, and Reassessment: Patient presented to the emerged from today with complaints of left-sided facial pain she is actually had discomfort since December when she had a dental abscess which was treated and she got better she has no dental pain. She was concerned that maybe her infection did not go away although she is had no fevers or signs of persistent bacterial infection. Her symptomology is consistent with more of a nerve injury/pain.   She had no neurologic dysfunction, her cranial nerves were intact, she had no infectious prodrome, I did an assessment and considered a CVA however her stroke assessment was negative and again her symptoms have been going on since December just worse over the last several days, we did discuss potential work-up, I also offered the patient a course of steroids and follow-up with her PCP, patient decided she would do the steroid treatment and then follow-up with her PCP I do think this is reasonable also no indications of an acute emergent medical condition, I do not feel that further testing in the ER would get us closer to answering her chronic discomfort. Patient agreeable with this plan she was discharged in good condition. Disposition Considerations (include 1 Tests not done, Shared Decision Making, Pt Expectation of Test or Tx.): --  Appropriate for outpatient management        I am the Primary Clinician of Record. FINAL IMPRESSION      1.  Facial pain          DISPOSITION/PLAN     DISPOSITION Decision to Discharge    PATIENT REFERRED TO:  Zack Rubio    Schedule an appointment as soon as possible for a visit   For follow up    [facility]  ED  [address]  Go to   If symptoms worsen      DISCHARGE MEDICATIONS:  Discharge Medication List as of 1/10/2023  3:39 PM        START taking these medications    Details   methylPREDNISolone (MEDROL, STEVIE,) 4 MG tablet Take by mouth., Disp-1 kit, R-0Normal             DISCONTINUED MEDICATIONS:  Discharge Medication List as of 1/10/2023  3:39 PM                 (Please note that portions of this note were completed with a voice recognition program.  Efforts were made to edit the dictations but occasionally words are mis-transcribed.)    CECIL Palomino CNP (electronically signed)       CECIL Palomino CNP  01/12/23 1112

## 2024-01-26 ENCOUNTER — APPOINTMENT (OUTPATIENT)
Dept: GENERAL RADIOLOGY | Age: 59
End: 2024-01-26
Payer: COMMERCIAL

## 2024-01-26 ENCOUNTER — APPOINTMENT (OUTPATIENT)
Dept: CT IMAGING | Age: 59
End: 2024-01-26
Payer: COMMERCIAL

## 2024-01-26 ENCOUNTER — HOSPITAL ENCOUNTER (EMERGENCY)
Age: 59
Discharge: HOME | End: 2024-01-26
Attending: EMERGENCY MEDICINE
Payer: COMMERCIAL

## 2024-01-26 VITALS
OXYGEN SATURATION: 99 % | HEART RATE: 73 BPM | RESPIRATION RATE: 11 BRPM | DIASTOLIC BLOOD PRESSURE: 74 MMHG | SYSTOLIC BLOOD PRESSURE: 123 MMHG | TEMPERATURE: 97.6 F

## 2024-01-26 DIAGNOSIS — R42 LIGHTHEADEDNESS: Primary | ICD-10-CM

## 2024-01-26 LAB
ALBUMIN SERPL-MCNC: 4.4 G/DL (ref 3.4–5)
ALBUMIN/GLOB SERPL: 1.8 {RATIO} (ref 1.1–2.2)
ALP SERPL-CCNC: 73 U/L (ref 40–129)
ALT SERPL-CCNC: 17 U/L (ref 10–40)
ANION GAP SERPL CALCULATED.3IONS-SCNC: 12 MMOL/L (ref 3–16)
AST SERPL-CCNC: 20 U/L (ref 15–37)
BASOPHILS # BLD: 0 K/UL (ref 0–0.2)
BASOPHILS NFR BLD: 0.6 %
BILIRUB SERPL-MCNC: 0.9 MG/DL (ref 0–1)
BILIRUB UR QL STRIP.AUTO: NEGATIVE
BUN SERPL-MCNC: 16 MG/DL (ref 7–20)
CALCIUM SERPL-MCNC: 9.4 MG/DL (ref 8.3–10.6)
CHLORIDE SERPL-SCNC: 104 MMOL/L (ref 99–110)
CLARITY UR: CLEAR
CO2 SERPL-SCNC: 24 MMOL/L (ref 21–32)
COLOR UR: NORMAL
CREAT SERPL-MCNC: 0.7 MG/DL (ref 0.6–1.1)
D DIMER: <0.27 UG/ML FEU (ref 0–0.6)
DEPRECATED RDW RBC AUTO: 11.9 % (ref 12.4–15.4)
EKG ATRIAL RATE: 68 BPM
EKG DIAGNOSIS: NORMAL
EKG P AXIS: 71 DEGREES
EKG P-R INTERVAL: 170 MS
EKG Q-T INTERVAL: 382 MS
EKG QRS DURATION: 84 MS
EKG QTC CALCULATION (BAZETT): 406 MS
EKG R AXIS: 72 DEGREES
EKG T AXIS: 62 DEGREES
EKG VENTRICULAR RATE: 68 BPM
EOSINOPHIL # BLD: 0 K/UL (ref 0–0.6)
EOSINOPHIL NFR BLD: 0.5 %
GFR SERPLBLD CREATININE-BSD FMLA CKD-EPI: >60 ML/MIN/{1.73_M2}
GLUCOSE BLD-MCNC: 123 MG/DL (ref 70–99)
GLUCOSE SERPL-MCNC: 121 MG/DL (ref 70–99)
GLUCOSE UR STRIP.AUTO-MCNC: NEGATIVE MG/DL
HCT VFR BLD AUTO: 37.1 % (ref 36–48)
HGB BLD-MCNC: 12.7 G/DL (ref 12–16)
HGB UR QL STRIP.AUTO: NEGATIVE
KETONES UR STRIP.AUTO-MCNC: NEGATIVE MG/DL
LEUKOCYTE ESTERASE UR QL STRIP.AUTO: NEGATIVE
LYMPHOCYTES # BLD: 1 K/UL (ref 1–5.1)
LYMPHOCYTES NFR BLD: 24.8 %
MCH RBC QN AUTO: 30.6 PG (ref 26–34)
MCHC RBC AUTO-ENTMCNC: 34.4 G/DL (ref 31–36)
MCV RBC AUTO: 89.2 FL (ref 80–100)
MONOCYTES # BLD: 0.3 K/UL (ref 0–1.3)
MONOCYTES NFR BLD: 7.3 %
NEUTROPHILS # BLD: 2.8 K/UL (ref 1.7–7.7)
NEUTROPHILS NFR BLD: 66.8 %
NITRITE UR QL STRIP.AUTO: NEGATIVE
PERFORMED ON: ABNORMAL
PH UR STRIP.AUTO: 7.5 [PH] (ref 5–8)
PLATELET # BLD AUTO: 234 K/UL (ref 135–450)
PMV BLD AUTO: 8.7 FL (ref 5–10.5)
POTASSIUM SERPL-SCNC: 3.7 MMOL/L (ref 3.5–5.1)
PROT SERPL-MCNC: 6.9 G/DL (ref 6.4–8.2)
PROT UR STRIP.AUTO-MCNC: NEGATIVE MG/DL
RBC # BLD AUTO: 4.16 M/UL (ref 4–5.2)
SODIUM SERPL-SCNC: 140 MMOL/L (ref 136–145)
SP GR UR STRIP.AUTO: 1.01 (ref 1–1.03)
TROPONIN, HIGH SENSITIVITY: <6 NG/L (ref 0–14)
UA COMPLETE W REFLEX CULTURE PNL UR: NORMAL
UA DIPSTICK W REFLEX MICRO PNL UR: NORMAL
URN SPEC COLLECT METH UR: NORMAL
UROBILINOGEN UR STRIP-ACNC: 0.2 E.U./DL
WBC # BLD AUTO: 4.2 K/UL (ref 4–11)

## 2024-01-26 PROCEDURE — 84484 ASSAY OF TROPONIN QUANT: CPT

## 2024-01-26 PROCEDURE — 85025 COMPLETE CBC W/AUTO DIFF WBC: CPT

## 2024-01-26 PROCEDURE — 6360000004 HC RX CONTRAST MEDICATION: Performed by: EMERGENCY MEDICINE

## 2024-01-26 PROCEDURE — 93010 ELECTROCARDIOGRAM REPORT: CPT | Performed by: INTERNAL MEDICINE

## 2024-01-26 PROCEDURE — 85379 FIBRIN DEGRADATION QUANT: CPT

## 2024-01-26 PROCEDURE — 81003 URINALYSIS AUTO W/O SCOPE: CPT

## 2024-01-26 PROCEDURE — 71045 X-RAY EXAM CHEST 1 VIEW: CPT

## 2024-01-26 PROCEDURE — 99285 EMERGENCY DEPT VISIT HI MDM: CPT

## 2024-01-26 PROCEDURE — 80053 COMPREHEN METABOLIC PANEL: CPT

## 2024-01-26 PROCEDURE — 93005 ELECTROCARDIOGRAM TRACING: CPT | Performed by: EMERGENCY MEDICINE

## 2024-01-26 PROCEDURE — 70450 CT HEAD/BRAIN W/O DYE: CPT

## 2024-01-26 PROCEDURE — 70498 CT ANGIOGRAPHY NECK: CPT

## 2024-01-26 RX ADMIN — IOPAMIDOL 75 ML: 755 INJECTION, SOLUTION INTRAVENOUS at 15:22

## 2024-01-26 ASSESSMENT — ENCOUNTER SYMPTOMS
BACK PAIN: 0
RHINORRHEA: 0
CHEST TIGHTNESS: 0
DIARRHEA: 0
ABDOMINAL PAIN: 0
COUGH: 0
PHOTOPHOBIA: 0
SHORTNESS OF BREATH: 0

## 2024-01-26 ASSESSMENT — LIFESTYLE VARIABLES
HOW MANY STANDARD DRINKS CONTAINING ALCOHOL DO YOU HAVE ON A TYPICAL DAY: PATIENT DOES NOT DRINK
HOW OFTEN DO YOU HAVE A DRINK CONTAINING ALCOHOL: MONTHLY OR LESS

## 2024-01-26 NOTE — ED PROVIDER NOTES
Mood and Affect: Mood normal.         Behavior: Behavior normal.       1a  Level of consciousness: 0=alert; keenly responsive   1b. LOC questions:  0=Performs both tasks correctly   1c. LOC commands: 0=Performs both tasks correctly   2.  Best Gaze: 0=normal   3. Visual: 0=No visual loss   4. Facial Palsy: 0=Normal symmetric movement   5a. Motor left arm: 0=No drift, limb holds 90 (or 45) degrees for full 10 seconds   5b.  Motor right arm: 0=No drift, limb holds 90 (or 45) degrees for full 10 seconds   6a. motor left le=No drift, limb holds 90 (or 45) degrees for full 10 seconds   6b  Motor right le=No drift, limb holds 90 (or 45) degrees for full 10 seconds   7. Limb Ataxia: 0=Absent   8.  Sensory: 0=Normal; no sensory loss   9. Best Language:  0=No aphasia, normal   10. Dysarthria: 0=Normal   11. Extinction and Inattention: 0=No abnormality   12. Distal motor function: 0=Normal    Total:  00       DIAGNOSTIC RESULTS:  LABS:    Labs Reviewed   POCT GLUCOSE - Abnormal; Notable for the following components:       Result Value    POC Glucose 123 (*)     All other components within normal limits   CBC WITH AUTO DIFFERENTIAL   COMPREHENSIVE METABOLIC PANEL W/ REFLEX TO MG FOR LOW K   TROPONIN   D-DIMER, QUANTITATIVE       When ordered, only abnormal lab results are displayed. All other labs were within normal range or not returned as of this dictation.      Independent EKG Interpretation: ***       RADIOLOGY:    Plain radiographic images are visualized and preliminarily interpreted by the ED Provider with the below findings: ***    Non-plain film images such as CT, Ultrasound and MRI are read by the radiologist. Interpretation per the Radiologist below, if available at the time of this note:  XR CHEST PORTABLE    (Results Pending)         PROCEDURES:  Unless otherwise noted below, none.    Procedures      MEDICATIONS:   Medications - No data to display    _____________________________________    MEDICAL

## 2024-01-26 NOTE — ED NOTES
Writer reviewed discharge instructions, medications, and follow-up with PCP; patient verbalized understanding. Patient's IV removed with no complications with dressing in place. Patient stable, ambulatory with steady gait, GCS 15, no signs/ symptoms of acute distress, respirations unlabored, and with spouse.

## 2024-11-07 NOTE — ED NOTES
Pt discharged in stable condition. Pt had no further questions at this time.       Donavon Olvera RN  01/10/23 2974 complains of pain/discomfort